# Patient Record
Sex: FEMALE | URBAN - NONMETROPOLITAN AREA
[De-identification: names, ages, dates, MRNs, and addresses within clinical notes are randomized per-mention and may not be internally consistent; named-entity substitution may affect disease eponyms.]

---

## 2024-02-02 ENCOUNTER — OV NP (OUTPATIENT)
Dept: URBAN - NONMETROPOLITAN AREA CLINIC 2 | Facility: CLINIC | Age: 54
End: 2024-02-02

## 2024-02-09 ENCOUNTER — OV NP (OUTPATIENT)
Dept: URBAN - NONMETROPOLITAN AREA CLINIC 2 | Facility: CLINIC | Age: 54
End: 2024-02-09
Payer: COMMERCIAL

## 2024-02-09 VITALS
HEIGHT: 64 IN | BODY MASS INDEX: 26.8 KG/M2 | HEART RATE: 77 BPM | DIASTOLIC BLOOD PRESSURE: 80 MMHG | WEIGHT: 157 LBS | SYSTOLIC BLOOD PRESSURE: 116 MMHG

## 2024-02-09 DIAGNOSIS — Z98.890 HISTORY OF NISSEN FUNDOPLICATION: ICD-10-CM

## 2024-02-09 DIAGNOSIS — Z12.11 SCREENING FOR MALIGNANT NEOPLASM OF COLON: ICD-10-CM

## 2024-02-09 DIAGNOSIS — R10.30 LOWER ABDOMINAL PAIN: ICD-10-CM

## 2024-02-09 DIAGNOSIS — R13.19 ESOPHAGEAL DYSPHAGIA: ICD-10-CM

## 2024-02-09 DIAGNOSIS — K62.5 RECTAL BLEEDING: ICD-10-CM

## 2024-02-09 PROCEDURE — 99204 OFFICE O/P NEW MOD 45 MIN: CPT | Performed by: INTERNAL MEDICINE

## 2024-02-09 NOTE — HPI-TODAY'S VISIT:
53-year-old female presents for evaluation of chronic GERD and lower GI pain.  She reports she has a history of Nissen fundoplication done in 2020 which has been very helpful for her chronic acid reflux.  She does endorse some mild dysphagia to rice in her cervical region since the Nissen.  She has a history of a total abdominal hysterectomy in the past and since that time has had intermittent lower abdominal pain.  This accompanied with fever and obstructive symptoms.  This will emergency release and she will have loose stools for day and recover.  She had a colonoscopy about 15 years ago that she reports was normal.  She asks about EGD with dilation and colonoscopy today.  She is currently feeling well.  She has been moving her bowels regularly.  She has not had one of these obstructive episodes in quite some time.

## 2024-04-18 ENCOUNTER — COL/EGD (OUTPATIENT)
Dept: URBAN - NONMETROPOLITAN AREA SURGERY CENTER 1 | Facility: SURGERY CENTER | Age: 54
End: 2024-04-18
Payer: COMMERCIAL

## 2024-04-18 ENCOUNTER — LAB (OUTPATIENT)
Dept: URBAN - METROPOLITAN AREA CLINIC 4 | Facility: CLINIC | Age: 54
End: 2024-04-18
Payer: COMMERCIAL

## 2024-04-18 DIAGNOSIS — Z98.890 HISTORY OF NISSEN FUNDOPLICATION: ICD-10-CM

## 2024-04-18 DIAGNOSIS — K25.9 ANTRAL ULCER: ICD-10-CM

## 2024-04-18 DIAGNOSIS — Z12.11 COLON CANCER SCREENING: ICD-10-CM

## 2024-04-18 DIAGNOSIS — K29.70 GASTRITIS, UNSPECIFIED, WITHOUT BLEEDING: ICD-10-CM

## 2024-04-18 DIAGNOSIS — K21.9 ACID REFLUX: ICD-10-CM

## 2024-04-18 DIAGNOSIS — K21.9 GASTRO-ESOPHAGEAL REFLUX DISEASE WITHOUT ESOPHAGITIS: ICD-10-CM

## 2024-04-18 DIAGNOSIS — K31.89 OTHER DISEASES OF STOMACH AND DUODENUM: ICD-10-CM

## 2024-04-18 DIAGNOSIS — R13.19 OTHER DYSPHAGIA: ICD-10-CM

## 2024-04-18 PROBLEM — 8493009: Status: ACTIVE | Noted: 2024-04-18

## 2024-04-18 PROCEDURE — 45378 DIAGNOSTIC COLONOSCOPY: CPT | Performed by: INTERNAL MEDICINE

## 2024-04-18 PROCEDURE — 43239 EGD BIOPSY SINGLE/MULTIPLE: CPT | Performed by: INTERNAL MEDICINE

## 2024-04-18 PROCEDURE — 43249 ESOPH EGD DILATION <30 MM: CPT | Performed by: INTERNAL MEDICINE

## 2024-04-18 PROCEDURE — 88312 SPECIAL STAINS GROUP 1: CPT | Performed by: PATHOLOGY

## 2024-04-18 PROCEDURE — 88305 TISSUE EXAM BY PATHOLOGIST: CPT | Performed by: PATHOLOGY

## 2024-06-12 ENCOUNTER — OFFICE VISIT (OUTPATIENT)
Dept: URBAN - NONMETROPOLITAN AREA CLINIC 2 | Facility: CLINIC | Age: 54
End: 2024-06-12
Payer: COMMERCIAL

## 2024-06-12 ENCOUNTER — DASHBOARD ENCOUNTERS (OUTPATIENT)
Age: 54
End: 2024-06-12

## 2024-06-12 VITALS
HEART RATE: 72 BPM | HEIGHT: 64 IN | BODY MASS INDEX: 26.91 KG/M2 | DIASTOLIC BLOOD PRESSURE: 80 MMHG | SYSTOLIC BLOOD PRESSURE: 124 MMHG | WEIGHT: 157.6 LBS

## 2024-06-12 DIAGNOSIS — K62.5 RECTAL BLEEDING: ICD-10-CM

## 2024-06-12 DIAGNOSIS — Z12.11 SCREENING FOR MALIGNANT NEOPLASM OF COLON: ICD-10-CM

## 2024-06-12 DIAGNOSIS — K29.50 CHRONIC GASTRITIS WITHOUT BLEEDING, UNSPECIFIED GASTRITIS TYPE: ICD-10-CM

## 2024-06-12 DIAGNOSIS — Z98.890 HISTORY OF NISSEN FUNDOPLICATION: ICD-10-CM

## 2024-06-12 DIAGNOSIS — K57.30 SIGMOID DIVERTICULOSIS: ICD-10-CM

## 2024-06-12 DIAGNOSIS — R10.30 LOWER ABDOMINAL PAIN: ICD-10-CM

## 2024-06-12 DIAGNOSIS — R13.19 ESOPHAGEAL DYSPHAGIA: ICD-10-CM

## 2024-06-12 PROBLEM — 429430001: Status: ACTIVE | Noted: 2024-06-12

## 2024-06-12 PROCEDURE — 99212 OFFICE O/P EST SF 10 MIN: CPT | Performed by: NURSE PRACTITIONER

## 2024-06-12 RX ORDER — OMEPRAZOLE 40 MG/1
1 CAPSULE CAPSULE, DELAYED RELEASE ORAL TWICE A DAY
Qty: 180 | Refills: 3 | Status: ACTIVE | COMMUNITY
Start: 2024-04-18

## 2024-06-12 NOTE — HPI-OTHER HISTORIES
2/9/2024: 53-year-old female presents for evaluation of chronic GERD and lower GI pain. She reports she has a history of Nissen fundoplication done in 2020 which has been very helpful for her chronic acid reflux. She does endorse some mild dysphagia to rice in her cervical region since the Nissen. She has a history of a total abdominal hysterectomy in the past and since that time has had intermittent lower abdominal pain. This accompanied with fever and obstructive symptoms. This will emergency release and she will have loose stools for day and recover. She had a colonoscopy about 15 years ago that she reports was normal. She asks about EGD with dilation and colonoscopy today. She is currently feeling well. She has been moving her bowels regularly. She has not had one of these obstructive episodes in quite some time.  4/18/2024 - EGD: intact nissen fundoplication, gastritis, esophagus dilated to 20 mm; advised to Start omeprazole 40 mg twice daily x 8 weeks to treat gastritis noted on EGD today.    - COLON: sigmoid diverticulosis, otherwise normal. repeat in 10 years (2034)

## 2024-06-12 NOTE — HPI-TODAY'S VISIT:
Ms. Marilyn Meeks presents for follow-up of an upper endoscopy and colonoscopy.  In April she had both procedures performed.  EGD showed an intact Nissen fundoplication and gastritis.  Her esophagus was also dilated to 20 mm.  She was advised to treat gastritis with twice daily PPI.  Unfortunately Marilyn does not tolerate these medications so she decided to not take it.  She did take famotidine for around 2 weeks.  Her dysphagia resolved after the EGD.  She denies any epigastric discomfort today.  Colonoscopy was notable for sigmoid diverticulosis but was otherwise normal.  No hemorrhoids were noted.  Marilyn has not had any further episodes of rectal bleeding since her last visit and she denies any discomfort today.  Overall doing well.  LG. Implemented All Universal Safety Interventions:  [place] to call system. Call bell, personal items and telephone within reach. Instruct patient to call for assistance. Room bathroom lighting operational. Non-slip footwear when patient is off stretcher. Physically safe environment: no spills, clutter or unnecessary equipment. Stretcher in lowest position, wheels locked, appropriate side rails in place.